# Patient Record
Sex: MALE | Race: WHITE | Employment: FULL TIME | URBAN - METROPOLITAN AREA
[De-identification: names, ages, dates, MRNs, and addresses within clinical notes are randomized per-mention and may not be internally consistent; named-entity substitution may affect disease eponyms.]

---

## 2019-08-29 ENCOUNTER — HOSPITAL ENCOUNTER (EMERGENCY)
Facility: CLINIC | Age: 54
Discharge: HOME | End: 2019-08-29
Attending: EMERGENCY MEDICINE | Admitting: EMERGENCY MEDICINE
Payer: COMMERCIAL

## 2019-08-29 VITALS
HEART RATE: 75 BPM | TEMPERATURE: 97.9 F | OXYGEN SATURATION: 98 % | WEIGHT: 184.75 LBS | SYSTOLIC BLOOD PRESSURE: 148 MMHG | RESPIRATION RATE: 18 BRPM | DIASTOLIC BLOOD PRESSURE: 105 MMHG

## 2019-08-29 DIAGNOSIS — K04.7 DENTAL ABSCESS: ICD-10-CM

## 2019-08-29 PROCEDURE — 41800 DRAINAGE OF GUM LESION: CPT

## 2019-08-29 PROCEDURE — 99283 EMERGENCY DEPT VISIT LOW MDM: CPT | Mod: 25

## 2019-08-29 RX ORDER — IBUPROFEN 600 MG/1
600 TABLET, FILM COATED ORAL EVERY 6 HOURS PRN
COMMUNITY

## 2019-08-29 RX ORDER — BUPIVACAINE HYDROCHLORIDE AND EPINEPHRINE 5; 5 MG/ML; UG/ML
INJECTION, SOLUTION PERINEURAL
Status: DISCONTINUED
Start: 2019-08-29 | End: 2019-08-29 | Stop reason: HOSPADM

## 2019-08-29 RX ORDER — CLINDAMYCIN HCL 300 MG
300 CAPSULE ORAL 4 TIMES DAILY
Qty: 40 CAPSULE | Refills: 0 | Status: SHIPPED | OUTPATIENT
Start: 2019-08-29 | End: 2019-09-08

## 2019-08-29 RX ORDER — HYDROCODONE BITARTRATE AND ACETAMINOPHEN 5; 325 MG/1; MG/1
1-2 TABLET ORAL EVERY 4 HOURS PRN
Qty: 8 TABLET | Refills: 0 | Status: SHIPPED | OUTPATIENT
Start: 2019-08-29 | End: 2019-09-01

## 2019-08-29 ASSESSMENT — ENCOUNTER SYMPTOMS: TROUBLE SWALLOWING: 0

## 2019-08-29 NOTE — ED AVS SNAPSHOT
[facility]  [address]  [facility] [address]  Phone:  [phone]  Fax:  [phone]                                    Waylon Connors   MRN: [id number]    Department:  [facility]   Date of Visit:  8/29/2019           After Visit Summary Signature Page    I have received my discharge instructions, and my questions have been answered. I have discussed any challenges I see with this plan with the nurse or doctor.    ..........................................................................................................................................  Patient/Patient Representative Signature      ..........................................................................................................................................  Patient Representative Print Name and Relationship to Patient    ..................................................               ................................................  Date                                   Time    ..........................................................................................................................................  Reviewed by Signature/Title    ...................................................              ..............................................  Date                                               Time          22EPIC Rev 08/18

## 2019-08-29 NOTE — DISCHARGE INSTRUCTIONS
Take ibuprofen 600 mg 3x per day.  This will provide both pain control and fight against inflammation.  You were given a prescription for norco (hydrocodone and acetaminophen).  This is a strong, narcotic pain medication.  It may cause drowsiness and mild changes in cognition.  Do not drive or operate machinery while taking this medication.  Do not mix this medication with alcohol or other sedating medications.  This medication contains tylenol (acetaminophen) therefore do not take additional tylenol (acetaminophen) while taking norco.  This medication can cause constipation.  The use of over the counter laxatives and stool softeners can help prevent this.    Opioid Medication Information    You have been given a prescription for an opioid (narcotic) pain medicine and/or have received a pain medicine while here in the Emergency Department. These medicines can make you drowsy or impaired. You must not drive, operate dangerous equipment, or engage in any other dangerous activities while taking these medications. If you drive while taking these medications, you could be arrested for driving under the influence (DUI). Do not drink any alcohol while you are taking these medications.     Opioid pain medications can cause addiction. If you have a history of chemical dependency of any type, you are at a higher risk of becoming addicted to pain medications.  Only take these prescribed medications to treat your pain when all other options have been tried. Take it for as short a time and as few doses as possible. Store your pain pills in a secure place, as they are frequently stolen and provide a dangerous opportunity for children or visitors in your house to start abusing these powerful medications. We will not replace any lost or stolen medicine.    If you do not finish your medication, it is a good idea to get rid of it but please do not flush it down the toilet. Please dispose of the remaining medication at a local  pharmacy or law enforcement facility. The [place] [employer] has additional information on medication disposal: https://www.pca.[facility].[place]/living-green/managing-unwanted-medications.      Many prescription pain medications contain Tylenol  (acetaminophen), including Vicodin , Tylenol #3 , Norco , Lortab , and Percocet .  You should not take any extra pills of Tylenol  if you are using these prescription medications or you can get very sick.  Do not ever take more than 3000 mg of acetaminophen in any 24 hour period.    All opioids tend to cause constipation. Drink plenty of water and eat foods that have a lot of fiber, such as fruits, vegetables, prune juice, apple juice and high fiber cereal.  Take a laxative if you don t move your bowels at least every other day. Miralax , Milk of Magnesia, Colace , or Senna  can be used to keep you regular.

## 2019-08-29 NOTE — ED PROVIDER NOTES
History     Chief Complaint:  Dental Pain      HPI   Waylon Connors is a 53 year old male who presents with dental pain. The patient says that he has some left sided facial swelling and dental pain since yesterday. He says that last night he had a tactile fever, he did not check his temperature just noted that he was cold. He denies any injuries to his jaw or any difficulty swallowing.       Allergies:  No Known Drug Allergies     Medications:    Enbrel  Ibuprofen     Past Medical History:    Past medical history reviewed. No pertinent medical history.     Past Surgical History:    Appendectomy  Orthopedic surgery     Family History:    Family history reviewed. No pertinent family history.     Social History:  Smoking Status: Current Smoker  Alcohol Use: Positive  Drug Use: Positive   Marital Status:  Single        Review of Systems   HENT: Positive for dental problem. Negative for trouble swallowing.    All other systems reviewed and are negative.        Physical Exam     Patient Vitals for the past 24 hrs:   BP Temp Temp src Pulse Heart Rate Resp SpO2 Weight   08/29/19 0416 (!) 148/105 -- -- 75 -- -- -- --   08/29/19 0333 (!) 160/110 97.9  F (36.6  C) Oral -- 86 18 98 % 83.8 kg (184 lb 11.9 oz)        Physical Exam  Gen: alert, answers all questions appropriately   HEENT:  No fluctuance on the roof of the mouth. Sublingual area soft and nontender.  No trismus. Buccal abscess on left. Lower mandibular molars are carious. Swelling does not cross angle of mandible  Neck: supple, full AROM   CV: RRR, no murmurs   Pulm: breath sounds equal, lungs clear  Skin: facial skin normal  Neuro: CN 5 and 7 normal       Emergency Department Course     Procedures:    Incision and Drainage     LOCATIONS:  Left mandibular dental abscess     ANESTHESIA:  Inferior alveolar block using Marcaine 0.5% with epinephrine, total of 1 mLs       PROCEDURE:  Area was incised with # 11 Blade ([place]) with a Single Straight incision.  Wound  treatment included Purulent Drainage.  Packing consisted of No Packing.     PATIENT STATUS:        Patient tolerated the procedure well. There were no complications.             Emergency Department Course:    0337 Nursing notes and vitals reviewed.    0338 I performed an exam of the patient as documented above.     0407 I performed the incision and drainage procedure as documented above.      0431 The patient is discharged to home.     Impression & Plan      Medical Decision Making:  Waylon Connors is a 53 year old male who presented for evaluation of dental pain and facial swelling.  Exam showed dental abscess.  I & D preformed as above. Patient placed on Clindamycin. Advised to use Ibuprofen or tylenol for discomfort.  No indication for deeper space infection, PTA, retropharyngeal abscess, facial cellulitis, or Tony's angina.  Follow up with dentist in the next 2-3 days or immediately if worsening.  Return to ED if develops difficulty swallowing, high fevers (not controlled with medications), worsening swelling, or for other concerns.    Diagnosis:    ICD-10-CM    1. Dental abscess K04.7      Disposition:   Findings and plan explained to the Patient. Patient discharged home with instructions regarding supportive care, medications, and reasons to return. The importance of close follow-up was reviewed.     Discharge Medications:     Review of your medicines         START taking      Dose / Directions   clindamycin 300 MG capsule  Commonly known as:  CLEOCIN      Dose:  300 mg  Take 1 capsule (300 mg) by mouth 4 times daily for 10 days  Quantity:  40 capsule  Refills:  0     HYDROcodone-acetaminophen 5-325 MG tablet  Commonly known as:  NORCO      Dose:  1-2 tablet  Take 1-2 tablets by mouth every 4 hours as needed for pain  Quantity:  8 tablet  Refills:  0           Where to get your medicines      Some of these will need a paper prescription and others can be bought over the counter. Ask your nurse if you have  questions.    Bring a paper prescription for each of these medications    clindamycin 300 MG capsule    HYDROcodone-acetaminophen 5-325 MG tablet         Scribe Disclosure:  I, Luke Cardozo, am serving as a scribe at 3:38 AM on 8/29/2019 to document services personally performed by Dee Dee Leonard based on my observations and the provider's statements to me.      [facility] EMERGENCY DEPARTMENT       Dee Dee Leonard MD  08/29/19 8295